# Patient Record
Sex: FEMALE | Race: ASIAN | NOT HISPANIC OR LATINO | Employment: FULL TIME | URBAN - METROPOLITAN AREA
[De-identification: names, ages, dates, MRNs, and addresses within clinical notes are randomized per-mention and may not be internally consistent; named-entity substitution may affect disease eponyms.]

---

## 2023-05-22 ENCOUNTER — TRANSFERRED RECORDS (OUTPATIENT)
Dept: HEALTH INFORMATION MANAGEMENT | Facility: CLINIC | Age: 63
End: 2023-05-22
Payer: COMMERCIAL

## 2023-05-24 NOTE — TELEPHONE ENCOUNTER
RECORDS RECEIVED FROM:    DATE RECEIVED:    NOTES STATUS DETAILS   OFFICE NOTE from referring provider  N/A    OFFICE NOTE from other cardiologists  Care Everywhere 11-30-21   RECORDS from hospital/ED N/A    MEDICATION LIST Internal    GENERAL CARDIO RECORDS   (ALL APPOINTMENT TYPES)     LABS (CBC,BMP,CMP, TSH) Internal    EKG (STRIPS & REPORTS) In process 5-22-23 Requested   MONITORS (STRIPS & REPORTS) N/A    ECHOS (IMAGES AND REPORTS) N/A    STRESS TESTS (IMAGES AND REPORTS) N/A    cMRI (IMAGES AND REPORTS) N/A    CT/CTA (IMAGES AND REPORTS) N/A      Action 5/24/23   Fax #[phone]   Action Taken Requested EKGs 5-22-23    Sent EKG to scanning

## 2023-05-25 ENCOUNTER — TRANSCRIBE ORDERS (OUTPATIENT)
Dept: OTHER | Age: 63
End: 2023-05-25

## 2023-05-25 DIAGNOSIS — R94.31 ABNORMAL ELECTROCARDIOGRAM: Primary | ICD-10-CM

## 2023-05-25 DIAGNOSIS — Z01.818 PREOPERATIVE EXAMINATION: ICD-10-CM

## 2023-05-30 ENCOUNTER — PRE VISIT (OUTPATIENT)
Dept: CARDIOLOGY | Facility: CLINIC | Age: 63
End: 2023-05-30
Payer: COMMERCIAL

## 2023-05-30 ENCOUNTER — OFFICE VISIT (OUTPATIENT)
Dept: CARDIOLOGY | Facility: CLINIC | Age: 63
End: 2023-05-30
Attending: INTERNAL MEDICINE
Payer: COMMERCIAL

## 2023-05-30 VITALS
BODY MASS INDEX: 26.35 KG/M2 | HEIGHT: 59 IN | DIASTOLIC BLOOD PRESSURE: 82 MMHG | HEART RATE: 78 BPM | WEIGHT: 130.7 LBS | SYSTOLIC BLOOD PRESSURE: 117 MMHG | OXYGEN SATURATION: 95 %

## 2023-05-30 DIAGNOSIS — Z01.818 PREOPERATIVE EXAMINATION: ICD-10-CM

## 2023-05-30 DIAGNOSIS — E78.2 MIXED HYPERLIPIDEMIA: Primary | ICD-10-CM

## 2023-05-30 DIAGNOSIS — R94.31 ABNORMAL ELECTROCARDIOGRAM: ICD-10-CM

## 2023-05-30 PROCEDURE — G0463 HOSPITAL OUTPT CLINIC VISIT: HCPCS | Performed by: INTERNAL MEDICINE

## 2023-05-30 PROCEDURE — 93005 ELECTROCARDIOGRAM TRACING: CPT

## 2023-05-30 PROCEDURE — 93010 ELECTROCARDIOGRAM REPORT: CPT | Performed by: INTERNAL MEDICINE

## 2023-05-30 PROCEDURE — 99204 OFFICE O/P NEW MOD 45 MIN: CPT | Performed by: INTERNAL MEDICINE

## 2023-05-30 RX ORDER — UBIQUINOL 100 MG
1 CAPSULE ORAL DAILY
COMMUNITY
End: 2023-06-20

## 2023-05-30 RX ORDER — LEVOTHYROXINE SODIUM 75 UG/1
75 TABLET ORAL DAILY
COMMUNITY
Start: 2022-11-15

## 2023-05-30 ASSESSMENT — PAIN SCALES - GENERAL: PAINLEVEL: NO PAIN (0)

## 2023-05-30 NOTE — NURSING NOTE
Chief Complaint   Patient presents with     New Patient     New Cardiology- cardiac clearance, new - Referred by Dr. Alyssia Gabriel  [facility]       Hyperlipidemia     Vitals were taken, medications reconciled, and EKG was performed.    SHAUNA Robison  11:24 AM

## 2023-05-30 NOTE — PROGRESS NOTES
I am delighted to see Elio Vidales in consultation.The primary encounter diagnosis was Mixed hyperlipidemia. Diagnoses of Abnormal electrocardiogram and Preoperative examination were also pertinent to this visit.   As you know, the patient is a 62 year old  female. She   has a past medical history of Hyperlipidemia..    On this visit, the patient states that she has moderate exercise tolerance.  The patient denies chest pressure/discomfort, dyspnea, palpitations, near-syncope, syncope, orthopnea, paroxysmal nocturnal dyspnea and lower extermity edema.    The patient's cardiovascular risk factors include high cholesterol.    The following portions of the patient's history were reviewed and updated as appropriate: allergies, current medications, past family history, past medical history, past social history, past surgical history, and the problem list.    PMH: The patient's past medical history includes:    Past Medical History:   Diagnosis Date     Hyperlipidemia       History reviewed. No pertinent surgical history.    The patient's medications as of the current encounter are:     Current Outpatient Medications   Medication Sig Dispense Refill     Glucosamine 750 MG TABS Take 1 capsule by mouth daily       levothyroxine (SYNTHROID/LEVOTHROID) 75 MCG tablet Take 75 mcg by mouth daily         Labs:     No results found for any previous visit.       Allergies:    Allergies   Allergen Reactions     Aspirin GI Disturbance     gi upset     Atorvastatin Muscle Pain (Myalgia)     Fall 2013 , low dose, symptoms after being on for years but coinciding with a new refill/brand change     Ibuprofen GI Disturbance     gastritis     Meperidine Other (See Comments)     With vistaril shot for migraine 10-7-2000  Went unresponsive and low blood pressure , was sent to ER, was observed and able to go home same day     Pravastatin Muscle Pain (Myalgia)     Rosuvastatin Muscle Pain (Myalgia)     5 mg daily, 2017     Simvastatin  "Muscle Pain (Myalgia)     Muscle aches     Penicillins Rash     with a shot       Family History:   Family History   Problem Relation Age of Onset     Diabetes Mother      No Known Problems Father      No Known Problems Brother      No Known Problems Brother      No Known Problems Brother      No Known Problems Brother      No Known Problems Sister      No Known Problems Son        Psychosocial history:  reports that she has never smoked. She has been exposed to tobacco smoke. She has never used smokeless tobacco.    Review of systems: negative for, palpitations, exertional chest pain or pressure, paroxysmal nocturnal dyspnea, dyspnea on exertion, orthopnea, lower extremity edema and syncope or near-syncope    In addition,   General: No change in weight, sleep or appetite.  Normal energy.  No fever or chills  Eyes: Negative for vision changes or eye problems  ENT: No problems with ears, nose or throat.  No difficulty swallowing.  Resp: No coughing, wheezing or shortness of breath  GI: No nausea, vomiting,  heartburn, abdominal pain, diarrhea, constipation or change in bowel habits  : No urinary frequency or dysuria, bladder or kidney problems  Musculoskeletal: Right knee pain, polio left leg  Neurologic: No headaches, numbness, tingling, weakness, problems with balance or coordination  Psychiatric: No problems with anxiety, depression or mental health  Heme/immune/allergy: No history of bleeding or clotting problems or anemia.   Endocrine: Thyroid cancer resection  Skin: No rashes,worrisome lesions or skin problems  Vascular:  No claudication, lifestyle limiting or otherwise; no ischemic rest pain; no non-healing ulcers. No weakness, No loss of sensation        Physical examination  Vitals: /82 (BP Location: Right arm, Patient Position: Supine, Cuff Size: Adult Regular)   Pulse 78   Ht 1.507 m (4' 11.33\")   Wt 59.3 kg (130 lb 11.2 oz)   SpO2 95%   BMI 26.10 kg/m    BMI= Body mass index is 26.1 " kg/m .    In general, the patient is a pleasant female in no apparent distress.    HEENT: Normiocephalic and atraumatic.  PERRLA.  EOMI.  Sclerae white, not injected.  Nares clear.  Pharynx without erythema or exudate.  Dentition intact.    Neck: No adenopathy.  No thyromegaly. Carotids +2/2 bilaterally without bruits.  No jugular venous distension.   Heart:  The PMI is in the 5th ICS in the midclavicular line. There is no heave. Regular rate and rhythm. Normal S1, S2 splits physiologically. No murmur, rub, click, or gallop.    Lungs: Clear to asculation.  No ronchi, wheezes, rales.  No dullness to percussion.   Abdomen: Soft, nontender, nondistended. No organomegaly. No AAA.  No bruits.   Extremities: No clubbing, cyanosis, or edema. The pulses were intact bilaterally.   Neurological: The neurological examination reveal a patient who was oriented to person, place, and time.  Left leg weakness from polio    Cardiac tests include:    ECG : NSR,  AMI, age undetermined    Assessment and Plan    1. Preop - will check dobutamine echo    The patient is to return  prn. The patient understood the treatment plan as outlined above.  There were no barriers to learning.      William Bello MD     Patient had a normal dobutamine echo suggesting low cardiac risk during a surgical procedure.

## 2023-05-30 NOTE — LETTER
5/30/2023      RE: Elio Vidales  [address]  [facility] [address]       Dear Colleague,    Thank you for the opportunity to participate in the care of your patient, Elio Vidales, at the [facility]. Please see a copy of my visit note below.    I am delighted to see Elio Vidales in consultation.The primary encounter diagnosis was Mixed hyperlipidemia. Diagnoses of Abnormal electrocardiogram and Preoperative examination were also pertinent to this visit.   As you know, the patient is a 62 year old  female. She   has a past medical history of Hyperlipidemia..    On this visit, the patient states that she has moderate exercise tolerance.  The patient denies chest pressure/discomfort, dyspnea, palpitations, near-syncope, syncope, orthopnea, paroxysmal nocturnal dyspnea and lower extermity edema.    The patient's cardiovascular risk factors include high cholesterol.    The following portions of the patient's history were reviewed and updated as appropriate: allergies, current medications, past family history, past medical history, past social history, past surgical history, and the problem list.    PMH: The patient's past medical history includes:    Past Medical History:   Diagnosis Date     Hyperlipidemia       History reviewed. No pertinent surgical history.    The patient's medications as of the current encounter are:     Current Outpatient Medications   Medication Sig Dispense Refill     Glucosamine 750 MG TABS Take 1 capsule by mouth daily       levothyroxine (SYNTHROID/LEVOTHROID) 75 MCG tablet Take 75 mcg by mouth daily         Labs:     No results found for any previous visit.       Allergies:    Allergies   Allergen Reactions     Aspirin GI Disturbance     gi upset     Atorvastatin Muscle Pain (Myalgia)     Fall 2013 , low dose, symptoms after being on for years but coinciding with a new refill/brand change      Ibuprofen GI Disturbance     gastritis     Meperidine Other (See Comments)     With vistaril shot for migraine 10-7-2000  Went unresponsive and low blood pressure , was sent to ER, was observed and able to go home same day     Pravastatin Muscle Pain (Myalgia)     Rosuvastatin Muscle Pain (Myalgia)     5 mg daily, 2017     Simvastatin Muscle Pain (Myalgia)     Muscle aches     Penicillins Rash     with a shot       Family History:   Family History   Problem Relation Age of Onset     Diabetes Mother      No Known Problems Father      No Known Problems Brother      No Known Problems Brother      No Known Problems Brother      No Known Problems Brother      No Known Problems Sister      No Known Problems Son        Psychosocial history:  reports that she has never smoked. She has been exposed to tobacco smoke. She has never used smokeless tobacco.    Review of systems: negative for, palpitations, exertional chest pain or pressure, paroxysmal nocturnal dyspnea, dyspnea on exertion, orthopnea, lower extremity edema and syncope or near-syncope    In addition,   General: No change in weight, sleep or appetite.  Normal energy.  No fever or chills  Eyes: Negative for vision changes or eye problems  ENT: No problems with ears, nose or throat.  No difficulty swallowing.  Resp: No coughing, wheezing or shortness of breath  GI: No nausea, vomiting,  heartburn, abdominal pain, diarrhea, constipation or change in bowel habits  : No urinary frequency or dysuria, bladder or kidney problems  Musculoskeletal: Right knee pain, polio left leg  Neurologic: No headaches, numbness, tingling, weakness, problems with balance or coordination  Psychiatric: No problems with anxiety, depression or mental health  Heme/immune/allergy: No history of bleeding or clotting problems or anemia.   Endocrine: Thyroid cancer resection  Skin: No rashes,worrisome lesions or skin problems  Vascular:  No claudication, lifestyle limiting or otherwise; no  "ischemic rest pain; no non-healing ulcers. No weakness, No loss of sensation        Physical examination  Vitals: /82 (BP Location: Right arm, Patient Position: Supine, Cuff Size: Adult Regular)   Pulse 78   Ht 1.507 m (4' 11.33\")   Wt 59.3 kg (130 lb 11.2 oz)   SpO2 95%   BMI 26.10 kg/m    BMI= Body mass index is 26.1 kg/m .    In general, the patient is a pleasant female in no apparent distress.    HEENT: Normiocephalic and atraumatic.  PERRLA.  EOMI.  Sclerae white, not injected.  Nares clear.  Pharynx without erythema or exudate.  Dentition intact.    Neck: No adenopathy.  No thyromegaly. Carotids +2/2 bilaterally without bruits.  No jugular venous distension.   Heart:  The PMI is in the 5th ICS in the midclavicular line. There is no heave. Regular rate and rhythm. Normal S1, S2 splits physiologically. No murmur, rub, click, or gallop.    Lungs: Clear to asculation.  No ronchi, wheezes, rales.  No dullness to percussion.   Abdomen: Soft, nontender, nondistended. No organomegaly. No AAA.  No bruits.   Extremities: No clubbing, cyanosis, or edema. The pulses were intact bilaterally.   Neurological: The neurological examination reveal a patient who was oriented to person, place, and time.  Left leg weakness from polio    Cardiac tests include:    ECG : NSR,  AMI, age undetermined    Assessment and Plan    1. Preop - will check dobutamine echo    The patient is to return  prn. The patient understood the treatment plan as outlined above.  There were no barriers to learning.      William Bello MD       "

## 2023-05-30 NOTE — PATIENT INSTRUCTIONS
Complete a dobutamine stress echocardiogram.  As soon as results are compiled and reviewed, you will be notified.    Dobutamine Stress Echocardiography (Echo)    This type of echocardiogram involves using dobutamine, a medicine that stimulates your heart similar to the way exercise does. It increases your heart rate and the squeezing function of your heart. Your healthcare provider gives you dobutamine through an IV. They then take ultrasound pictures of your heart, using sound waves through a device placed on your chest wall (echocardiography). The pictures are taken before and after you get dobutamine. This lets your healthcare provider see if blood is flowing properly through the heart and if your heart is pumping normally.    Before your test :    No alcohol, smoking, or caffeine for 12 hours before the test.   Don't eat for at least 3 hours before the test.  Make sure to wear a two-piece outfit. You may need to undress from the waist up and put on a short hospital gown.    During your test :  Your healthcare provider places small pads (electrodes) on your chest to record the electrical activity of your heart.  An intravenous (IV) line will be placed in your arm.  A painless device (transducer) coated with gel is moved firmly over your chest. This device creates ultrasonic, inaudible sound waves that make images of your heart on a screen.  You will slowly be given dobutamine through the IV, in small doses about every 3 minutes. It is normal to feel your heart pound for a few minutes, while the medicine is being given.  Images will be obtained before the infusion of the drug, during the infusion, and after your pulse returns to normal.  Your pulse and blood pressure  will be monitored during and after the test.    After your test :  When the test is over, you may return to your normal routine.

## 2023-05-31 LAB
ATRIAL RATE - MUSE: 77 BPM
DIASTOLIC BLOOD PRESSURE - MUSE: NORMAL MMHG
INTERPRETATION ECG - MUSE: NORMAL
P AXIS - MUSE: 22 DEGREES
PR INTERVAL - MUSE: 128 MS
QRS DURATION - MUSE: 78 MS
QT - MUSE: 386 MS
QTC - MUSE: 436 MS
R AXIS - MUSE: -20 DEGREES
SYSTOLIC BLOOD PRESSURE - MUSE: NORMAL MMHG
T AXIS - MUSE: -6 DEGREES
VENTRICULAR RATE- MUSE: 77 BPM

## 2023-06-01 ENCOUNTER — HOSPITAL ENCOUNTER (OUTPATIENT)
Dept: CARDIOLOGY | Facility: CLINIC | Age: 63
Discharge: HOME | End: 2023-06-01
Attending: INTERNAL MEDICINE | Admitting: INTERNAL MEDICINE
Payer: COMMERCIAL

## 2023-06-01 DIAGNOSIS — Z01.818 PREOPERATIVE EXAMINATION: ICD-10-CM

## 2023-06-01 DIAGNOSIS — R94.31 ABNORMAL ELECTROCARDIOGRAM: ICD-10-CM

## 2023-06-01 PROCEDURE — 93321 DOPPLER ECHO F-UP/LMTD STD: CPT | Mod: 26 | Performed by: STUDENT IN AN ORGANIZED HEALTH CARE EDUCATION/TRAINING PROGRAM

## 2023-06-01 PROCEDURE — 258N000003 HC RX IP 258 OP 636: Performed by: STUDENT IN AN ORGANIZED HEALTH CARE EDUCATION/TRAINING PROGRAM

## 2023-06-01 PROCEDURE — 93325 DOPPLER ECHO COLOR FLOW MAPG: CPT | Mod: 26 | Performed by: STUDENT IN AN ORGANIZED HEALTH CARE EDUCATION/TRAINING PROGRAM

## 2023-06-01 PROCEDURE — 999N000127 HC STATISTIC PERIPHERAL IV START W US GUIDANCE

## 2023-06-01 PROCEDURE — 93018 CV STRESS TEST I&R ONLY: CPT | Performed by: STUDENT IN AN ORGANIZED HEALTH CARE EDUCATION/TRAINING PROGRAM

## 2023-06-01 PROCEDURE — 250N000011 HC RX IP 250 OP 636: Performed by: STUDENT IN AN ORGANIZED HEALTH CARE EDUCATION/TRAINING PROGRAM

## 2023-06-01 PROCEDURE — 255N000002 HC RX 255 OP 636: Performed by: STUDENT IN AN ORGANIZED HEALTH CARE EDUCATION/TRAINING PROGRAM

## 2023-06-01 PROCEDURE — 93016 CV STRESS TEST SUPVJ ONLY: CPT | Performed by: STUDENT IN AN ORGANIZED HEALTH CARE EDUCATION/TRAINING PROGRAM

## 2023-06-01 PROCEDURE — 250N000009 HC RX 250: Performed by: STUDENT IN AN ORGANIZED HEALTH CARE EDUCATION/TRAINING PROGRAM

## 2023-06-01 PROCEDURE — C8928 TTE W OR W/O FOL W/CON,STRES: HCPCS

## 2023-06-01 PROCEDURE — 93350 STRESS TTE ONLY: CPT | Mod: 26 | Performed by: STUDENT IN AN ORGANIZED HEALTH CARE EDUCATION/TRAINING PROGRAM

## 2023-06-01 RX ORDER — METOPROLOL TARTRATE 1 MG/ML
1-20 INJECTION, SOLUTION INTRAVENOUS
Status: ACTIVE | OUTPATIENT
Start: 2023-06-01 | End: 2023-06-01

## 2023-06-01 RX ORDER — ATROPINE SULFATE 0.4 MG/ML
.2-2 AMPUL (ML) INJECTION
Status: COMPLETED | OUTPATIENT
Start: 2023-06-01 | End: 2023-06-01

## 2023-06-01 RX ORDER — SODIUM CHLORIDE 9 MG/ML
INJECTION, SOLUTION INTRAVENOUS CONTINUOUS
Status: ACTIVE | OUTPATIENT
Start: 2023-06-01 | End: 2023-06-01

## 2023-06-01 RX ORDER — DOBUTAMINE HYDROCHLORIDE 200 MG/100ML
10-50 INJECTION INTRAVENOUS CONTINUOUS
Status: ACTIVE | OUTPATIENT
Start: 2023-06-01 | End: 2023-06-01

## 2023-06-01 RX ADMIN — DOBUTAMINE 10 MCG/KG/MIN: 12.5 INJECTION, SOLUTION, CONCENTRATE INTRAVENOUS at 14:45

## 2023-06-01 RX ADMIN — METOPROLOL TARTRATE 1 MG: 5 INJECTION INTRAVENOUS at 15:01

## 2023-06-01 RX ADMIN — PERFLUTREN 7 ML: 6.52 INJECTION, SUSPENSION INTRAVENOUS at 15:02

## 2023-06-01 RX ADMIN — ATROPINE SULFATE 0.2 MG: 0.4 INJECTION, SOLUTION INTRAVENOUS at 14:55

## 2023-06-01 NOTE — PROGRESS NOTES
Pt here for dobutamine stress test. Test, meds and side effects reviewed with patient. Achieved target HR at 40 mcg Dobutamine and a total of 0.2 mg IV atropine. Gave a total of 1 mg IV Metoprolol to bring HR back to baseline. Post monitoring complete and VSS. Pt escorted out to the gold waiting room.

## 2023-06-06 ENCOUNTER — TELEPHONE (OUTPATIENT)
Dept: CARDIOLOGY | Facility: CLINIC | Age: 63
End: 2023-06-06
Payer: COMMERCIAL

## 2023-06-06 NOTE — TELEPHONE ENCOUNTER
[facility] Call Center    Phone Message    May a detailed message be left on voicemail: yes     Reason for Call: Other: Patient is scheduled for total knee replacement 6/13/23. Her orthopedic doctor  And pcp need letter stating its ok to have this surgery. Send letter to Dr. James Streeter with [facility] fax# [phone]  And PCP Dr. Gabriel fax# [phone]. Please call the patient when this is complete    Action Taken: Other: cardiology    Travel Screening: Not Applicable   Thank you!  [facility]

## 2023-06-17 ENCOUNTER — HEALTH MAINTENANCE LETTER (OUTPATIENT)
Age: 63
End: 2023-06-17

## 2023-06-17 DIAGNOSIS — R52 PAIN: Primary | ICD-10-CM

## 2023-06-17 RX ORDER — OXYCODONE HYDROCHLORIDE 5 MG/1
TABLET ORAL
Qty: 20 TABLET | Refills: 0 | Status: SHIPPED | OUTPATIENT
Start: 2023-06-17 | End: 2023-06-19

## 2023-06-17 NOTE — TELEPHONE ENCOUNTER
[facility] TELEPHONE ENCOUNTER    Chief Complaint   Patient presents with     Refill Request       Elio Eliasor is a 62 year old  (1960),Nurse called today to report: New Admit who arrived yesterday with orders for PRN oxycodone 2.5mg-5mg every 4 hours PRN. Staff are requesting a script but also she is wanting dose increase. Decline dose increase at this time. Needs to be followed up by rounding provider first before dose increase approved.     ASSESSMENT/PLAN      Script sent for oxycodone-Take 0.5-1 Tablets (2.5-5 mg) by mouth every 4 hours as needed for Pain. Take 0.5 tablet for pain rated at 4-7. Take 1 tablets for pain rated 8-10.    Continue Tylenol TID    Electronically signed by:   STEVEN Ching CNP

## 2023-06-19 DIAGNOSIS — R52 PAIN: ICD-10-CM

## 2023-06-19 RX ORDER — OXYCODONE HYDROCHLORIDE 5 MG/1
TABLET ORAL
Qty: 30 TABLET | Refills: 0 | Status: SHIPPED | OUTPATIENT
Start: 2023-06-19 | End: 2023-06-20

## 2023-06-20 ENCOUNTER — TRANSITIONAL CARE UNIT VISIT (OUTPATIENT)
Dept: GERIATRICS | Facility: CLINIC | Age: 63
End: 2023-06-20
Payer: COMMERCIAL

## 2023-06-20 VITALS
WEIGHT: 146.8 LBS | HEART RATE: 103 BPM | SYSTOLIC BLOOD PRESSURE: 143 MMHG | RESPIRATION RATE: 18 BRPM | BODY MASS INDEX: 28.82 KG/M2 | OXYGEN SATURATION: 98 % | TEMPERATURE: 96.8 F | DIASTOLIC BLOOD PRESSURE: 81 MMHG | HEIGHT: 60 IN

## 2023-06-20 DIAGNOSIS — M25.561 CHRONIC PAIN OF RIGHT KNEE: ICD-10-CM

## 2023-06-20 DIAGNOSIS — R11.0 NAUSEA: ICD-10-CM

## 2023-06-20 DIAGNOSIS — G89.29 CHRONIC PAIN OF RIGHT KNEE: ICD-10-CM

## 2023-06-20 DIAGNOSIS — M17.11 PRIMARY OSTEOARTHRITIS OF RIGHT KNEE: Primary | ICD-10-CM

## 2023-06-20 DIAGNOSIS — M62.81 GENERALIZED MUSCLE WEAKNESS: ICD-10-CM

## 2023-06-20 DIAGNOSIS — R52 PAIN: ICD-10-CM

## 2023-06-20 DIAGNOSIS — Z96.651 S/P TKR (TOTAL KNEE REPLACEMENT), RIGHT: ICD-10-CM

## 2023-06-20 PROCEDURE — 99309 SBSQ NF CARE MODERATE MDM 30: CPT | Performed by: NURSE PRACTITIONER

## 2023-06-20 RX ORDER — ONDANSETRON 4 MG/1
1 TABLET, FILM COATED ORAL EVERY 8 HOURS PRN
COMMUNITY
Start: 2023-06-16

## 2023-06-20 RX ORDER — ACETAMINOPHEN 500 MG
1000 TABLET ORAL 3 TIMES DAILY
COMMUNITY
Start: 2023-06-13

## 2023-06-20 RX ORDER — SENNOSIDES A AND B 8.6 MG/1
1 TABLET, FILM COATED ORAL AT BEDTIME
COMMUNITY
Start: 2023-06-16

## 2023-06-20 RX ORDER — OXYCODONE HYDROCHLORIDE 5 MG/1
5 TABLET ORAL 3 TIMES DAILY
Qty: 60 TABLET | Refills: 0
Start: 2023-06-20

## 2023-06-20 NOTE — PROGRESS NOTES
[facility] GERIATRICS    PRIMARY CARE PROVIDER AND CLINIC:  Alyssia Gabriel MD, [address] [facility] [place] [address]  Chief Complaint   Patient presents with     [facility] Medical Record Number:  [id number]  Place of Service where encounter took place:  [facility] [[id number]]    Elio Mccauley Bedor  is a 62 year old  (1960), admitted to the above facility from  [facility] . Hospital stay 06/13/2023 through 06/16/2023..   HPI:    Admitting Diagnosis: Primary osteoarthritis of right knee [M17.11]   Discharge diagnosis: Right TKA  Procedure: Procedure(s) (LRB):  TOTAL KNEE JOINT REPLACEMENT (Right)   Date of Procedure: 6/13/2023  Surgeon: Dr Streeter    Disposition: transitional care unit   Code Status: Full  Discharge condition: stable    HPI:  Patient is a 62 y.o., she who presents with right knee OA. For full details please refer to Dr Streeter notes. Surgical management was recommended and patient underwent Procedure(s) (LRB):  TOTAL KNEE JOINT REPLACEMENT (Right).    Hospital Course:   Patient was admitted following the above noted procedure. Procedure was without complication. For full details please refer to operative note. Patient received routine shirley-operative antibiotic and DVT prophylaxis. she was evaluated by physical therapy and will discharge to tcu in stable condition.     Patient seen today for follow up, oriented, good historian, reports R knee pain 5/10 at rest, R leg edema 1-2+, tubigrip in place, dressing intact clean and dry, using immobilizer, DVT prophylaxis with xarelto, intermittent nausea with oxycodone, appears healthy, no distress.      CODE STATUS/ADVANCE DIRECTIVES DISCUSSION:  Full Code  On file  ALLERGIES:   Allergies   Allergen Reactions     Aspirin GI Disturbance     gi upset     Atorvastatin Muscle Pain (Myalgia)     Fall 2013 , low dose, symptoms after being on for years but coinciding with a new refill/brand change     Ibuprofen GI  Disturbance     gastritis     Meperidine Other (See Comments)     With vistaril shot for migraine 10-7-2000  Went unresponsive and low blood pressure , was sent to ER, was observed and able to go home same day     Pravastatin Muscle Pain (Myalgia)     Rosuvastatin Muscle Pain (Myalgia)     5 mg daily, 2017     Simvastatin Muscle Pain (Myalgia)     Muscle aches     Penicillins Rash     with a shot      PAST MEDICAL HISTORY:   Past Medical History:   Diagnosis Date     Hyperlipidemia       PAST SURGICAL HISTORY:   has no past surgical history on file.  FAMILY HISTORY: family history includes Diabetes in her mother; No Known Problems in her brother, brother, brother, brother, father, sister, and son.  SOCIAL HISTORY:   reports that she has never smoked. She has been exposed to tobacco smoke. She has never used smokeless tobacco.  Patient's living condition: lives with family, in home     Post Discharge Medication Reconciliation Status:   MED REC REQUIRED  Post Medication Reconciliation Status: discharge medications reconciled and changed, per note/orders       Current Outpatient Medications   Medication Sig     acetaminophen (TYLENOL) 500 MG tablet Take 1,000 mg by mouth 3 times daily     levothyroxine (SYNTHROID/LEVOTHROID) 75 MCG tablet Take 75 mcg by mouth daily     ondansetron (ZOFRAN) 4 MG tablet Take 1 tablet by mouth every 8 hours as needed     oxyCODONE (ROXICODONE) 5 MG tablet Take 1 tablet (5 mg) by mouth 3 times daily And 5mg PO TID PRN     rivaroxaban ANTICOAGULANT (XARELTO) 10 MG TABS tablet Take 10 mg by mouth daily     senna (SENOKOT) 8.6 MG tablet Take 1 tablet by mouth At Bedtime     No current facility-administered medications for this visit.       ROS:  4 point ROS including Respiratory, CV, GI and , other than that noted in the HPI,  is negative    Vitals:  BP (!) 143/81   Pulse 103   Temp 96.8  F (36  C)   Resp 18   Ht 1.524 m (5')   Wt 66.6 kg (146 lb 12.8 oz)   SpO2 98%   BMI 28.67 kg/m     Exam:  GENERAL APPEARANCE:  in no distress, appears healthy  ENT:  Mouth and posterior oropharynx normal, moist mucous membranes  RESP:  lungs clear to auscultation , no respiratory distress  CV:  peripheral edema 1+ in lower legs, rate-normal  ABDOMEN:  bowel sounds normal  M/S:   Gait and station abnormal unsteady gait  SKIN:  Inspection of skin and subcutaneous tissue baseline  NEURO:   Examination of sensation by touch normal  PSYCH:  affect and mood normal    Lab/Diagnostic data:  Recent labs in [facility] reviewed by me today.     ASSESSMENT/PLAN:    (M17.11) Primary osteoarthritis of right knee  (primary encounter diagnosis)  (Z96.651) S/P TKR (total knee replacement), right  (M25.561,  G89.29) Chronic pain of right knee  (R52) Pain  (R11.0) Nausea  (M62.81) Generalized muscle weakness  Comment: chronic pain, post TKA, tolerated well, pain+, able to stand, wearing immobilizer, intermittent nausea and headache  Plan:   -PT/OT eval and treat  -CBC, BMP, TSH on 6/26  -change oxycodone to 5mg TID and TID PRN  -change APAP to TID  -continue tubigrip, immobilizer R leg  -WBAT  -taking xarelto 10mg x42 days  -follow up ortho PRN       Electronically signed by:  STEVEN Erickson CNP

## 2023-06-20 NOTE — LETTER
6/20/2023        RE: Elio Mercado  [address] [place]  [place] [address]        [facility] GERIATRICS    PRIMARY CARE PROVIDER AND CLINIC:  Alyssia Gabriel MD, [id number] [place] [address]  Chief Complaint   Patient presents with     [facility] Medical Record Number:  [id number]  Place of Service where encounter took place:  [facility] AT [facility] ([facility]) [[id number]]    Elio Mercado  is a 62 year old  (1960), admitted to the above facility from  [facility] . Hospital stay 06/13/2023 through 06/16/2023..   HPI:    Admitting Diagnosis: Primary osteoarthritis of right knee [M17.11]   Discharge diagnosis: Right TKA  Procedure: Procedure(s) (LRB):  TOTAL KNEE JOINT REPLACEMENT (Right)   Date of Procedure: 6/13/2023  Surgeon: Dr Streeter    Disposition: transitional care unit   Code Status: Full  Discharge condition: stable    HPI:  Patient is a 62 y.o., she who presents with right knee OA. For full details please refer to Dr Streeter notes. Surgical management was recommended and patient underwent Procedure(s) (LRB):  TOTAL KNEE JOINT REPLACEMENT (Right).    Hospital Course:   Patient was admitted following the above noted procedure. Procedure was without complication. For full details please refer to operative note. Patient received routine shirley-operative antibiotic and DVT prophylaxis. she was evaluated by physical therapy and will discharge to tcu in stable condition.     Patient seen today for follow up, oriented, good historian, reports R knee pain 5/10 at rest, R leg edema 1-2+, tubigrip in place, dressing intact clean and dry, using immobilizer, DVT prophylaxis with xarelto, intermittent nausea with oxycodone, appears healthy, no distress.      CODE STATUS/ADVANCE DIRECTIVES DISCUSSION:  Full Code  On file  ALLERGIES:   Allergies   Allergen Reactions     Aspirin GI Disturbance     gi upset     Atorvastatin Muscle Pain (Myalgia)     Fall 2013 , low dose, symptoms  after being on for years but coinciding with a new refill/brand change     Ibuprofen GI Disturbance     gastritis     Meperidine Other (See Comments)     With vistaril shot for migraine 10-7-2000  Went unresponsive and low blood pressure , was sent to ER, was observed and able to go home same day     Pravastatin Muscle Pain (Myalgia)     Rosuvastatin Muscle Pain (Myalgia)     5 mg daily, 2017     Simvastatin Muscle Pain (Myalgia)     Muscle aches     Penicillins Rash     with a shot      PAST MEDICAL HISTORY:   Past Medical History:   Diagnosis Date     Hyperlipidemia       PAST SURGICAL HISTORY:   has no past surgical history on file.  FAMILY HISTORY: family history includes Diabetes in her mother; No Known Problems in her brother, brother, brother, brother, father, sister, and son.  SOCIAL HISTORY:   reports that she has never smoked. She has been exposed to tobacco smoke. She has never used smokeless tobacco.  Patient's living condition: lives with family, in home     Post Discharge Medication Reconciliation Status:   MED REC REQUIRED  Post Medication Reconciliation Status: discharge medications reconciled and changed, per note/orders       Current Outpatient Medications   Medication Sig     acetaminophen (TYLENOL) 500 MG tablet Take 1,000 mg by mouth 3 times daily     levothyroxine (SYNTHROID/LEVOTHROID) 75 MCG tablet Take 75 mcg by mouth daily     ondansetron (ZOFRAN) 4 MG tablet Take 1 tablet by mouth every 8 hours as needed     oxyCODONE (ROXICODONE) 5 MG tablet Take 1 tablet (5 mg) by mouth 3 times daily And 5mg PO TID PRN     rivaroxaban ANTICOAGULANT (XARELTO) 10 MG TABS tablet Take 10 mg by mouth daily     senna (SENOKOT) 8.6 MG tablet Take 1 tablet by mouth At Bedtime     No current facility-administered medications for this visit.       ROS:  4 point ROS including Respiratory, CV, GI and , other than that noted in the HPI,  is negative    Vitals:  BP (!) 143/81   Pulse 103   Temp 96.8  F (36  C)    Resp 18   Ht 1.524 m (5')   Wt 66.6 kg (146 lb 12.8 oz)   SpO2 98%   BMI 28.67 kg/m    Exam:  GENERAL APPEARANCE:  in no distress, appears healthy  ENT:  Mouth and posterior oropharynx normal, moist mucous membranes  RESP:  lungs clear to auscultation , no respiratory distress  CV:  peripheral edema 1+ in lower legs, rate-normal  ABDOMEN:  bowel sounds normal  M/S:   Gait and station abnormal unsteady gait  SKIN:  Inspection of skin and subcutaneous tissue baseline  NEURO:   Examination of sensation by touch normal  PSYCH:  affect and mood normal    Lab/Diagnostic data:  Recent labs in [facility] reviewed by me today.     ASSESSMENT/PLAN:    (M17.11) Primary osteoarthritis of right knee  (primary encounter diagnosis)  (Z96.651) S/P TKR (total knee replacement), right  (M25.561,  G89.29) Chronic pain of right knee  (R52) Pain  (R11.0) Nausea  (M62.81) Generalized muscle weakness  Comment: chronic pain, post TKA, tolerated well, pain+, able to stand, wearing immobilizer, intermittent nausea and headache  Plan:   -PT/OT eval and treat  -CBC, BMP, TSH on 6/26  -change oxycodone to 5mg TID and TID PRN  -change APAP to TID  -continue tubigrip, immobilizer R leg  -WBAT  -taking xarelto 10mg x42 days  -follow up ortho PRN       Electronically signed by:  STEVEN Erickson CNP                     Sincerely,        STEVEN Erickson CNP

## 2023-06-23 ENCOUNTER — TRANSITIONAL CARE UNIT VISIT (OUTPATIENT)
Dept: GERIATRICS | Facility: CLINIC | Age: 63
End: 2023-06-23
Payer: COMMERCIAL

## 2023-06-23 VITALS
RESPIRATION RATE: 14 BRPM | HEIGHT: 60 IN | TEMPERATURE: 97.3 F | WEIGHT: 146.8 LBS | SYSTOLIC BLOOD PRESSURE: 122 MMHG | DIASTOLIC BLOOD PRESSURE: 79 MMHG | OXYGEN SATURATION: 96 % | HEART RATE: 82 BPM | BODY MASS INDEX: 28.82 KG/M2

## 2023-06-23 DIAGNOSIS — M62.838 MUSCLE SPASM OF RIGHT LEG: ICD-10-CM

## 2023-06-23 DIAGNOSIS — G89.29 CHRONIC PAIN OF RIGHT KNEE: ICD-10-CM

## 2023-06-23 DIAGNOSIS — Z96.651 S/P TKR (TOTAL KNEE REPLACEMENT), RIGHT: ICD-10-CM

## 2023-06-23 DIAGNOSIS — Z71.89 ACP (ADVANCE CARE PLANNING): ICD-10-CM

## 2023-06-23 DIAGNOSIS — M25.561 CHRONIC PAIN OF RIGHT KNEE: ICD-10-CM

## 2023-06-23 DIAGNOSIS — M17.11 PRIMARY OSTEOARTHRITIS OF RIGHT KNEE: Primary | ICD-10-CM

## 2023-06-23 PROBLEM — M62.81 GENERALIZED MUSCLE WEAKNESS: Status: ACTIVE | Noted: 2023-06-23

## 2023-06-23 PROBLEM — R11.0 NAUSEA: Status: ACTIVE | Noted: 2023-06-23

## 2023-06-23 PROCEDURE — 99497 ADVNCD CARE PLAN 30 MIN: CPT | Mod: 25 | Performed by: NURSE PRACTITIONER

## 2023-06-23 PROCEDURE — 99309 SBSQ NF CARE MODERATE MDM 30: CPT | Performed by: NURSE PRACTITIONER

## 2023-06-23 RX ORDER — METHOCARBAMOL 500 MG/1
500 TABLET, FILM COATED ORAL 3 TIMES DAILY
Qty: 1 TABLET | Refills: 0
Start: 2023-06-23

## 2023-06-23 NOTE — LETTER
6/23/2023        RE: Elio Mercado  [address]  [place] [address]        [facility] GERIATRICS    Chief Complaint   Patient presents with     RECHECK     HPI:  Elio Mercado is a 62 year old  (1960), who is being seen today for an episodic care visit at: [facility] AT [facility] ([facility]) [[id number]]. Today's concern is:   1. Primary osteoarthritis of right knee    2. S/P TKR (total knee replacement), right    3. Chronic pain of right knee    4. Muscle spasm of right leg    5. ACP (advance care planning)      Patient seen for follow up, chronic R knee pain, post TKR on 6/13, pain+, compression in place, mild edema, dressing clean dry and intact, now having sharp spasms around knee with mild proximal and distal affects also, I&O adequate, on oxycodone and APAP scheduled, appears healthy, no distress.    Advance Care Planning Goals of Care Discussion 6/23/2023  Goals of care discussed with Elio Mercado on 6/23. Present at discussion: patiient. Questions discussed and patient response:  What is your understanding now of where you are with your illness?: good. How much information about what is likely to be ahead with your illness would you like to have?: all. As the clinician I communicated the following to the patient regarding their prognosis: good. If your health situation worsens, what are your most important goals?: get home. What are your biggest fears and worries about the future with your health?: walking well. Unacceptable function : NA. What abilities are so critical to your life that you cannot imagine living without them?: independence. Pt does NOT want to: die. If you become sicker, how much are you willing to go through for the possibility of gaining more time?: willing. Would this change if these were permanent states, if they did not get better?: maybe. How much does your agent and/or family know about your priorities and wishes?: everything. Added by STEVEN Erickson  CNP        Allergies, and PMH/PSH reviewed in [facility] today.  REVIEW OF SYSTEMS:  4 point ROS including Respiratory, CV, GI and , other than that noted in the HPI,  is negative    Objective:   /79   Pulse 82   Temp 97.3  F (36.3  C)   Resp 14   Ht 1.524 m (5')   Wt 66.6 kg (146 lb 12.8 oz)   SpO2 96%   BMI 28.67 kg/m    GENERAL APPEARANCE:  in no distress, appears healthy  ENT:  Mouth and posterior oropharynx normal, moist mucous membranes  RESP:  lungs clear to auscultation , no respiratory distress  CV:  peripheral edema mild+ in RLE, rate-normal  ABDOMEN:  bowel sounds normal  M/S:   Gait and station abnormal transfer assist  SKIN:  Inspection of skin and subcutaneous tissue baseline  NEURO:   Examination of sensation by touch normal  PSYCH:  affect and mood normal    Recent labs in [facility] reviewed by me today.     Assessment/Plan:  (M17.11) Primary osteoarthritis of right knee  (primary encounter diagnosis)  (Z96.651) S/P TKR (total knee replacement), right  (M25.561,  G89.29) Chronic pain of right knee  (M62.838) Muscle spasm of right leg  Comment: pain+, newer sharp spasms intermittent, dressing clean/dry  Plan:  -PT/OT working with  -continue oxycodone and APAP TID scheduled plus PRN  -start robaxin 500mg TID scheduled  -clarify glucosamine one tab daily  -considering discharge next week if possible  -follow up [facility]o on 6/26    (Z71.89) ACP (advance care planning)  Comment: code status confirmed FULL  Plan: [facility] ADVANCE CARE PLANNING FIRST 30 MINS          I spent 17 minutes F2f with patient in addition to todays visit completing a POLST form.    MED REC REQUIRED  Post Medication Reconciliation Status: medication reconcilation previously completed during another office visit        Electronically signed by: STEVEN Erickson CNP           Sincerely,        STEVEN Erickson CNP

## 2023-06-23 NOTE — PROGRESS NOTES
[facility] GERIATRICS    Chief Complaint   Patient presents with     RECHECK     HPI:  Elio Mercado is a 62 year old  (1960), who is being seen today for an episodic care visit at: [facility] AT [facility] ([facility]) [[id number]]. Today's concern is:   1. Primary osteoarthritis of right knee    2. S/P TKR (total knee replacement), right    3. Chronic pain of right knee    4. Muscle spasm of right leg    5. ACP (advance care planning)      Patient seen for follow up, chronic R knee pain, post TKR on 6/13, pain+, compression in place, mild edema, dressing clean dry and intact, now having sharp spasms around knee with mild proximal and distal affects also, I&O adequate, on oxycodone and APAP scheduled, appears healthy, no distress.    Advance Care Planning Goals of Care Discussion 6/23/2023  Goals of care discussed with Elio Mercado on 6/23. Present at discussion: patiient. Questions discussed and patient response:  What is your understanding now of where you are with your illness?: good. How much information about what is likely to be ahead with your illness would you like to have?: all. As the clinician I communicated the following to the patient regarding their prognosis: good. If your health situation worsens, what are your most important goals?: get home. What are your biggest fears and worries about the future with your health?: walking well. Unacceptable function : NA. What abilities are so critical to your life that you cannot imagine living without them?: independence. Pt does NOT want to: die. If you become sicker, how much are you willing to go through for the possibility of gaining more time?: willing. Would this change if these were permanent states, if they did not get better?: maybe. How much does your agent and/or family know about your priorities and wishes?: everything. Added by Michael Hoffman, STEVEN CNP        Allergies, and PMH/PSH reviewed in [facility] today.  REVIEW OF SYSTEMS:  4 point ROS  including Respiratory, CV, GI and , other than that noted in the HPI,  is negative    Objective:   /79   Pulse 82   Temp 97.3  F (36.3  C)   Resp 14   Ht 1.524 m (5')   Wt 66.6 kg (146 lb 12.8 oz)   SpO2 96%   BMI 28.67 kg/m    GENERAL APPEARANCE:  in no distress, appears healthy  ENT:  Mouth and posterior oropharynx normal, moist mucous membranes  RESP:  lungs clear to auscultation , no respiratory distress  CV:  peripheral edema mild+ in RLE, rate-normal  ABDOMEN:  bowel sounds normal  M/S:   Gait and station abnormal transfer assist  SKIN:  Inspection of skin and subcutaneous tissue baseline  NEURO:   Examination of sensation by touch normal  PSYCH:  affect and mood normal    Recent labs in [facility] reviewed by me today.     Assessment/Plan:  (M17.11) Primary osteoarthritis of right knee  (primary encounter diagnosis)  (Z96.651) S/P TKR (total knee replacement), right  (M25.561,  G89.29) Chronic pain of right knee  (M62.838) Muscle spasm of right leg  Comment: pain+, newer sharp spasms intermittent, dressing clean/dry  Plan:  -PT/OT working with  -continue oxycodone and APAP TID scheduled plus PRN  -start robaxin 500mg TID scheduled  -clarify glucosamine one tab daily  -considering discharge next week if possible  -follow up [facility] on 6/26    (Z71.89) ACP (advance care planning)  Comment: code status confirmed FULL  Plan: [facility] PLANNING FIRST 30 MINS          I spent 17 minutes F2f with patient in addition to todays visit completing a POLST form.    MED REC REQUIRED  Post Medication Reconciliation Status: medication reconcilation previously completed during another office visit        Electronically signed by: STEVEN Erickson CNP

## 2023-06-26 PROBLEM — R05.3 CHRONIC COUGH: Status: ACTIVE | Noted: 2022-07-29

## 2023-06-26 PROBLEM — K21.9 GASTROESOPHAGEAL REFLUX DISEASE: Status: ACTIVE | Noted: 2019-03-19

## 2023-06-26 PROBLEM — R73.03 PREDIABETES: Status: ACTIVE | Noted: 2020-01-28

## 2023-06-26 NOTE — PROGRESS NOTES
[facility] GERIATRICS DISCHARGE SUMMARY  Patient Name: Elio Mercado  YOB: 1960  [facility] Medical Record Number: [id number]  Place of Service Where Encounter Took Place: [facility]  AT [facility] ([facility]) [[id number]]    PRIMARY CARE PROVIDER AND CLINIC RESPONSIBLE AFTER TRANSFER: Alyssia Gabriel MD, [address] / [place] [address]; Non-FM Provider     Transferring providers: STEVEN Dumont CNP; Antoni Joyce MD  Recent Hospitalization/ED: [facility]  stay 6/13/23 to 6/16/23.  Date of SNF Admission: June 16, 2023  Date of SNF (anticipated) Discharge: June 27, 2023  Discharged to: previous independent home  Cognitive Scores: NA  Physical Function: Pivot transfers  DME: [facility]    CODE STATUS/ADVANCE DIRECTIVES DISCUSSION: No Order   ALLERGIES: Meperidine, Aspirin, Atorvastatin, Ibuprofen, Penicillins, Pravastatin, Rosuvastatin, and Simvastatin    NURSING FACILITY COURSE:  Medication Changes/Rationale:     See notes    Summary of nursing facility stay:   Primary osteoarthritis of right knee  S/P TKR (total knee replacement), right  Chronic pain of right knee  Pain  Nausea  Generalized muscle weakness       (M17.11) Primary osteoarthritis of right knee  (primary encounter diagnosis)  (Z96.651) S/P TKR (total knee replacement), right  (M25.561,  G89.29) Chronic pain of right knee  (R52) Pain  (R11.0) Nausea  (M62.81) Generalized muscle weakness  Comment: chronic pain, post TKA, tolerated well, pain+, able to stand, wearing immobilizer, intermittent nausea and headache  Plan:   -PT/OT eval and treat  -CBC, BMP, TSH on 6/26  -change oxycodone to 5mg TID and TID PRN  -start robaxin 500mg TID PRN, OK to cut in half  -change APAP to TID  -continue tubigrip, immobilizer R leg  -WBAT  -taking xarelto 10mg x42 days  -follow up ortho PRN    Discharge Medications:  MED REC REQUIRED  Post Medication Reconciliation Status: medication reconcilation previously completed during  another office visit    Current Outpatient Medications   Medication Sig Dispense Refill     acetaminophen (TYLENOL) 500 MG tablet Take 1,000 mg by mouth 3 times daily       levothyroxine (SYNTHROID/LEVOTHROID) 75 MCG tablet Take 75 mcg by mouth daily       methocarbamol (ROBAXIN) 500 MG tablet Take 1 tablet (500 mg) by mouth 3 times daily 1 tablet 0     ondansetron (ZOFRAN) 4 MG tablet Take 1 tablet by mouth every 8 hours as needed       oxyCODONE (ROXICODONE) 5 MG tablet Take 1 tablet (5 mg) by mouth 3 times daily And 5mg PO TID PRN 60 tablet 0     rivaroxaban ANTICOAGULANT (XARELTO) 10 MG TABS tablet Take 10 mg by mouth daily       senna (SENOKOT) 8.6 MG tablet Take 1 tablet by mouth At Bedtime       Controlled medications:   oxycodone x1 week sending home     Past Medical History:   Past Medical History:   Diagnosis Date     Hyperlipidemia      Physical Exam:   Vitals: /75   Pulse 102   Temp 96.9  F (36.1  C)   Resp 16   Ht 1.524 m (5')   Wt 66.6 kg (146 lb 12.8 oz)   SpO2 97%   BMI 28.67 kg/m    BMI: Body mass index is 28.67 kg/m .  GENERAL APPEARANCE:  in no distress, appears healthy  ENT:  Mouth and posterior oropharynx normal, moist mucous membranes, normal hearing acuity  RESP:  lungs clear to auscultation , no respiratory distress  CV:  regular rate and rhythm, no murmur, rub, or gallop, no edema  ABDOMEN:  bowel sounds normal  M/S:   Gait and station abnormal transfer assist  SKIN:  Inspection of skin and subcutaneous tissue baseline  NEURO:   Examination of sensation by touch normal  PSYCH:  affect and mood normal     SNF Labs: Labs done in SNF are in [facility] EPIC. Please refer to them using [facility] Everywhere.    DISCHARGE PLAN:    Follow up labs: No labs orders/due    Medical Follow Up:     Follow up with primary care provider in 1 weeks  Current [facility] scheduled appointments: None.    Discharge Services: Home Care: Physical Therapy, Occupational Therapy, Registered Nurse, Home Health  Aide.    Discharge Instructions Verbalized to Patient at Discharge:     None    TOTAL DISCHARGE TIME: Greater than 30 minutes  Electronically signed by:  STEVEN Erickson CNP        Documentation of Face-to-Face and Certification for Home Health Services     Patient: Elio Mercado   YOB: 1960  MR Number: [id number]  Today's Date: 6/27/2023    I certify that patient: Elio Mercado is under my care and that I, or a nurse practitioner or physician's assistant working with me, had a face-to-face encounter that meets the physician face-to-face encounter requirements with this patient on: 6/27/2023.    This encounter with the patient was in whole, or in part, for the following medical condition, which is the primary reason for home health care:   1. Primary osteoarthritis of right knee    2. S/P TKR (total knee replacement), right    3. Chronic pain of right knee    4. Pain    5. Nausea    6. Generalized muscle weakness          I certify that, based on my findings, the following services are medically necessary home health services: Nursing, Occupational Therapy, Physical Therapy and HHA.    My clinical findings support the need for the above services because: Nurse is needed: To provide caregiver training to assist with: pain, med education.., Occupational Therapy Services are needed to assess and treat cognitive ability and address ADL safety due to impairment in transfers., Physical Therapy Services are needed to assess and treat the following functional impairments: gait instability. and HHA fro bathing.    Further, I certify that my clinical findings support that this patient is homebound (i.e. absences from home require considerable and taxing effort and are for medical reasons or Congregational services or infrequently or of short duration when for other reasons) because: Requires assistance of another person or specialized equipment to access medical services because patient: Is unable to  operate assistive equipment on their own...    Based on the above findings. I certify that this patient is confined to the home and needs intermittent skilled nursing care, physical therapy and/or speech therapy.  The patient is under my care, and I have initiated the establishment of the plan of care.  This patient will be followed by a physician who will periodically review the plan of care.  Physician/Provider to provide follow up care: Alyssia Gabriel    Responsible Medicare certified PECOS Physician: Michael Hoffman NP  Electronic Physician Signature  Date: 6/27/2023

## 2023-06-27 ENCOUNTER — DISCHARGE SUMMARY NURSING HOME (OUTPATIENT)
Dept: GERIATRICS | Facility: CLINIC | Age: 63
End: 2023-06-27
Payer: COMMERCIAL

## 2023-06-27 VITALS
WEIGHT: 146.8 LBS | OXYGEN SATURATION: 97 % | SYSTOLIC BLOOD PRESSURE: 122 MMHG | HEART RATE: 102 BPM | RESPIRATION RATE: 16 BRPM | DIASTOLIC BLOOD PRESSURE: 75 MMHG | BODY MASS INDEX: 28.82 KG/M2 | TEMPERATURE: 96.9 F | HEIGHT: 60 IN

## 2023-06-27 DIAGNOSIS — G89.29 CHRONIC PAIN OF RIGHT KNEE: ICD-10-CM

## 2023-06-27 DIAGNOSIS — M62.81 GENERALIZED MUSCLE WEAKNESS: ICD-10-CM

## 2023-06-27 DIAGNOSIS — R11.0 NAUSEA: ICD-10-CM

## 2023-06-27 DIAGNOSIS — Z96.651 S/P TKR (TOTAL KNEE REPLACEMENT), RIGHT: ICD-10-CM

## 2023-06-27 DIAGNOSIS — R52 PAIN: ICD-10-CM

## 2023-06-27 DIAGNOSIS — M17.11 PRIMARY OSTEOARTHRITIS OF RIGHT KNEE: Primary | ICD-10-CM

## 2023-06-27 DIAGNOSIS — M25.561 CHRONIC PAIN OF RIGHT KNEE: ICD-10-CM

## 2023-06-27 PROCEDURE — 99316 NF DSCHRG MGMT 30 MIN+: CPT | Performed by: NURSE PRACTITIONER

## 2023-06-27 NOTE — LETTER
6/27/2023        RE: Elio Mccauley Bedor  [address]  [place] [address]        [facility] GERIATRICS DISCHARGE SUMMARY  Patient Name: Elio Mercado  YOB: 1960  [facility] Medical Record Number: [id number]  Place of Service Where Encounter Took Place: [facility]  AT [facility] [[id number]]    PRIMARY CARE PROVIDER AND CLINIC RESPONSIBLE AFTER TRANSFER: Alyssia Gabriel MD, [address] [place] / [place] [address]; Non-FM Provider     Transferring providers: STEVEN Dumont CNP; Antoni Joyce MD  Recent Hospitalization/ED: [facility]  stay 6/13/23 to 6/16/23.  Date of SNF Admission: June 16, 2023  Date of SNF (anticipated) Discharge: June 27, 2023  Discharged to: previous independent home  Cognitive Scores: NA  Physical Function: Pivot transfers  DME: Walker    CODE STATUS/ADVANCE DIRECTIVES DISCUSSION: No Order   ALLERGIES: Meperidine, Aspirin, Atorvastatin, Ibuprofen, Penicillins, Pravastatin, Rosuvastatin, and Simvastatin    NURSING FACILITY COURSE:  Medication Changes/Rationale:     See notes    Summary of nursing facility stay:   Primary osteoarthritis of right knee  S/P TKR (total knee replacement), right  Chronic pain of right knee  Pain  Nausea  Generalized muscle weakness       (M17.11) Primary osteoarthritis of right knee  (primary encounter diagnosis)  (Z96.651) S/P TKR (total knee replacement), right  (M25.561,  G89.29) Chronic pain of right knee  (R52) Pain  (R11.0) Nausea  (M62.81) Generalized muscle weakness  Comment: chronic pain, post TKA, tolerated well, pain+, able to stand, wearing immobilizer, intermittent nausea and headache  Plan:   -PT/OT eval and treat  -CBC, BMP, TSH on 6/26  -change oxycodone to 5mg TID and TID PRN  -start robaxin 500mg TID PRN, OK to cut in half  -change APAP to TID  -continue tubigrip, immobilizer R leg  -WBAT  -taking xarelto 10mg x42 days  -follow up ortho PRN    Discharge Medications:  MED REC REQUIRED  Post  Medication Reconciliation Status: medication reconcilation previously completed during another office visit    Current Outpatient Medications   Medication Sig Dispense Refill     acetaminophen (TYLENOL) 500 MG tablet Take 1,000 mg by mouth 3 times daily       levothyroxine (SYNTHROID/LEVOTHROID) 75 MCG tablet Take 75 mcg by mouth daily       methocarbamol (ROBAXIN) 500 MG tablet Take 1 tablet (500 mg) by mouth 3 times daily 1 tablet 0     ondansetron (ZOFRAN) 4 MG tablet Take 1 tablet by mouth every 8 hours as needed       oxyCODONE (ROXICODONE) 5 MG tablet Take 1 tablet (5 mg) by mouth 3 times daily And 5mg PO TID PRN 60 tablet 0     rivaroxaban ANTICOAGULANT (XARELTO) 10 MG TABS tablet Take 10 mg by mouth daily       senna (SENOKOT) 8.6 MG tablet Take 1 tablet by mouth At Bedtime       Controlled medications:   oxycodone x1 week sending home     Past Medical History:   Past Medical History:   Diagnosis Date     Hyperlipidemia      Physical Exam:   Vitals: /75   Pulse 102   Temp 96.9  F (36.1  C)   Resp 16   Ht 1.524 m (5')   Wt 66.6 kg (146 lb 12.8 oz)   SpO2 97%   BMI 28.67 kg/m    BMI: Body mass index is 28.67 kg/m .  GENERAL APPEARANCE:  in no distress, appears healthy  ENT:  Mouth and posterior oropharynx normal, moist mucous membranes, normal hearing acuity  RESP:  lungs clear to auscultation , no respiratory distress  CV:  regular rate and rhythm, no murmur, rub, or gallop, no edema  ABDOMEN:  bowel sounds normal  M/S:   Gait and station abnormal transfer assist  SKIN:  Inspection of skin and subcutaneous tissue baseline  NEURO:   Examination of sensation by touch normal  PSYCH:  affect and mood normal     SNF Labs: Labs done in SNF are in [facility] EPIC. Please refer to them using EPIC/Care Everywhere.    DISCHARGE PLAN:    Follow up labs: No labs orders/due    Medical Follow Up:     Follow up with primary care provider in 1 weeks  Current [facility] scheduled appointments: None.    Discharge  Services: Home Care: Physical Therapy, Occupational Therapy, Registered Nurse, Home Health Aide.    Discharge Instructions Verbalized to Patient at Discharge:     None    TOTAL DISCHARGE TIME: Greater than 30 minutes  Electronically signed by:  STEVEN Erickson CNP        Documentation of Face-to-Face and Certification for Home Health Services     Patient: Elio Mercado   YOB: 1960  MR Number: [id number]  Today's Date: 6/27/2023    I certify that patient: Elio Mercado is under my care and that I, or a nurse practitioner or physician's assistant working with me, had a face-to-face encounter that meets the physician face-to-face encounter requirements with this patient on: 6/27/2023.    This encounter with the patient was in whole, or in part, for the following medical condition, which is the primary reason for home health care:   1. Primary osteoarthritis of right knee    2. S/P TKR (total knee replacement), right    3. Chronic pain of right knee    4. Pain    5. Nausea    6. Generalized muscle weakness          I certify that, based on my findings, the following services are medically necessary home health services: Nursing, Occupational Therapy, Physical Therapy and HHA.    My clinical findings support the need for the above services because: Nurse is needed: To provide caregiver training to assist with: pain, med education.., Occupational Therapy Services are needed to assess and treat cognitive ability and address ADL safety due to impairment in transfers., Physical Therapy Services are needed to assess and treat the following functional impairments: gait instability. and HHA fro bathing.    Further, I certify that my clinical findings support that this patient is homebound (i.e. absences from home require considerable and taxing effort and are for medical reasons or [employer] or infrequently or of short duration when for other reasons) because: Requires assistance of another  person or specialized equipment to access medical services because patient: Is unable to operate assistive equipment on their own...    Based on the above findings. I certify that this patient is confined to the home and needs intermittent skilled nursing care, physical therapy and/or speech therapy.  The patient is under my care, and I have initiated the establishment of the plan of care.  This patient will be followed by a physician who will periodically review the plan of care.  Physician/Provider to provide follow up care: Alyssia Gabriel    Responsible Medicare certified PEC[employer] Physician: Michael Hoffman NP  Electronic Physician Signature  Date: 6/27/2023          Sincerely,        Michael Hoffman, STEVEN CNP

## 2024-08-10 ENCOUNTER — HEALTH MAINTENANCE LETTER (OUTPATIENT)
Age: 64
End: 2024-08-10

## 2024-10-19 ENCOUNTER — HEALTH MAINTENANCE LETTER (OUTPATIENT)
Age: 64
End: 2024-10-19

## 2025-05-24 ENCOUNTER — HEALTH MAINTENANCE LETTER (OUTPATIENT)
Age: 65
End: 2025-05-24

## (undated) RX ORDER — METOPROLOL TARTRATE 1 MG/ML
INJECTION, SOLUTION INTRAVENOUS
Status: DISPENSED
Start: 2023-06-01

## (undated) RX ORDER — ATROPINE SULFATE 0.4 MG/ML
AMPUL (ML) INJECTION
Status: DISPENSED
Start: 2023-06-01

## (undated) RX ORDER — DOBUTAMINE HYDROCHLORIDE 200 MG/100ML
INJECTION INTRAVENOUS
Status: DISPENSED
Start: 2023-06-01